# Patient Record
Sex: MALE | Race: WHITE | NOT HISPANIC OR LATINO
[De-identification: names, ages, dates, MRNs, and addresses within clinical notes are randomized per-mention and may not be internally consistent; named-entity substitution may affect disease eponyms.]

---

## 2017-03-19 ENCOUNTER — APPOINTMENT (OUTPATIENT)
Dept: POPULATION HEALTH | Facility: CLINIC | Age: 37
End: 2017-03-19

## 2019-01-16 ENCOUNTER — TRANSCRIPTION ENCOUNTER (OUTPATIENT)
Age: 39
End: 2019-01-16

## 2020-12-22 ENCOUNTER — APPOINTMENT (OUTPATIENT)
Dept: FAMILY MEDICINE | Facility: CLINIC | Age: 40
End: 2020-12-22
Payer: COMMERCIAL

## 2020-12-22 ENCOUNTER — NON-APPOINTMENT (OUTPATIENT)
Age: 40
End: 2020-12-22

## 2020-12-22 VITALS
WEIGHT: 215 LBS | HEART RATE: 64 BPM | RESPIRATION RATE: 14 BRPM | BODY MASS INDEX: 29.12 KG/M2 | TEMPERATURE: 98.4 F | DIASTOLIC BLOOD PRESSURE: 80 MMHG | SYSTOLIC BLOOD PRESSURE: 118 MMHG | HEIGHT: 72 IN | OXYGEN SATURATION: 98 %

## 2020-12-22 PROCEDURE — 00029S TESTOSTERONE: CUSTOM

## 2020-12-22 PROCEDURE — 00018S: CUSTOM

## 2020-12-22 PROCEDURE — 00017S: CUSTOM

## 2020-12-22 PROCEDURE — 00011S: CUSTOM

## 2020-12-22 PROCEDURE — 00012S: CUSTOM

## 2020-12-22 PROCEDURE — 00001S: CUSTOM

## 2020-12-22 PROCEDURE — 00026S: CUSTOM

## 2020-12-22 PROCEDURE — 00027S: CUSTOM

## 2020-12-22 PROCEDURE — 00028S VITAMIN D: CUSTOM

## 2020-12-22 PROCEDURE — 00009S: CUSTOM

## 2020-12-22 NOTE — HEALTH RISK ASSESSMENT
[Good] : ~his/her~  mood as  good [] : No [Yes] : Yes [Monthly or less (1 pt)] : Monthly or less (1 point) [1 or 2 (0 pts)] : 1 or 2 (0 points) [Never (0 pts)] : Never (0 points) [No falls in past year] : Patient reported no falls in the past year [0] : 2) Feeling down, depressed, or hopeless: Not at all (0) [HIV Test offered] : HIV Test offered [Hepatitis C test offered] : Hepatitis C test offered [Change in mental status noted] : No change in mental status noted [Alone] : lives alone [College] : College [] :

## 2021-11-02 ENCOUNTER — APPOINTMENT (OUTPATIENT)
Dept: DERMATOLOGY | Facility: CLINIC | Age: 41
End: 2021-11-02

## 2021-12-09 ENCOUNTER — NON-APPOINTMENT (OUTPATIENT)
Age: 41
End: 2021-12-09

## 2021-12-09 ENCOUNTER — APPOINTMENT (OUTPATIENT)
Dept: FAMILY MEDICINE | Facility: CLINIC | Age: 41
End: 2021-12-09
Payer: COMMERCIAL

## 2021-12-09 VITALS
TEMPERATURE: 97.9 F | WEIGHT: 212 LBS | SYSTOLIC BLOOD PRESSURE: 110 MMHG | DIASTOLIC BLOOD PRESSURE: 70 MMHG | RESPIRATION RATE: 16 BRPM | OXYGEN SATURATION: 98 % | HEART RATE: 68 BPM | HEIGHT: 72 IN | BODY MASS INDEX: 28.71 KG/M2

## 2021-12-09 PROCEDURE — 00009S: CUSTOM

## 2021-12-09 PROCEDURE — 00029S TESTOSTERONE: CUSTOM

## 2021-12-09 PROCEDURE — 00018S: CUSTOM

## 2021-12-09 PROCEDURE — 00026S: CUSTOM

## 2021-12-09 PROCEDURE — 00028S VITAMIN D: CUSTOM

## 2021-12-09 PROCEDURE — 00012S: CUSTOM

## 2021-12-09 PROCEDURE — 00027S: CUSTOM

## 2021-12-09 PROCEDURE — 00011S: CUSTOM

## 2021-12-09 PROCEDURE — 00002S: CUSTOM

## 2021-12-09 NOTE — HEALTH RISK ASSESSMENT
[Good] : ~his/her~  mood as  good [Yes] : Yes [2 - 3 times a week (3 pts)] : 2 - 3  times a week (3 points) [1 or 2 (0 pts)] : 1 or 2 (0 points) [Never (0 pts)] : Never (0 points) [No] : In the past 12 months have you used drugs other than those required for medical reasons? No [No falls in past year] : Patient reported no falls in the past year [0] : 2) Feeling down, depressed, or hopeless: Not at all (0) [HIV Test offered] : HIV Test offered [With Family] : lives with family [Employed] : employed [Fully functional (bathing, dressing, toileting, transferring, walking, feeding)] : Fully functional (bathing, dressing, toileting, transferring, walking, feeding) [Fully functional (using the telephone, shopping, preparing meals, housekeeping, doing laundry, using] : Fully functional and needs no help or supervision to perform IADLs (using the telephone, shopping, preparing meals, housekeeping, doing laundry, using transportation, managing medications and managing finances) [Smoke Detector] : smoke detector [Carbon Monoxide Detector] : carbon monoxide detector [Safety elements used in home] : safety elements used in home [Patient/Caregiver not ready to engage] : , patient/caregiver not ready to engage [Audit-CScore] : 3 [[id number]] : 0 [Change in mental status noted] : No change in mental status noted [Reports changes in hearing] : Reports no changes in hearing [Reports changes in vision] : Reports no changes in vision [Reports changes in dental health] : Reports no changes in dental health

## 2023-01-09 ENCOUNTER — APPOINTMENT (OUTPATIENT)
Dept: FAMILY MEDICINE | Facility: CLINIC | Age: 43
End: 2023-01-09

## 2023-01-09 NOTE — HEALTH RISK ASSESSMENT
[Good] : ~his/her~  mood as  good [Yes] : Yes [2 - 3 times a week (3 pts)] : 2 - 3  times a week (3 points) [1 or 2 (0 pts)] : 1 or 2 (0 points) [Never (0 pts)] : Never (0 points) [No] : In the past 12 months have you used drugs other than those required for medical reasons? No [No falls in past year] : Patient reported no falls in the past year [0] : 2) Feeling down, depressed, or hopeless: Not at all (0) [Audit-CScore] : 3 [[id number]] : 0 [HIV Test offered] : HIV Test offered [Change in mental status noted] : No change in mental status noted [With Family] : lives with family [Employed] : employed [Fully functional (bathing, dressing, toileting, transferring, walking, feeding)] : Fully functional (bathing, dressing, toileting, transferring, walking, feeding) [Fully functional (using the telephone, shopping, preparing meals, housekeeping, doing laundry, using] : Fully functional and needs no help or supervision to perform IADLs (using the telephone, shopping, preparing meals, housekeeping, doing laundry, using transportation, managing medications and managing finances) [Reports changes in hearing] : Reports no changes in hearing [Reports changes in vision] : Reports no changes in vision [Reports changes in dental health] : Reports no changes in dental health [Smoke Detector] : smoke detector [Carbon Monoxide Detector] : carbon monoxide detector [Safety elements used in home] : safety elements used in home [Patient/Caregiver not ready to engage] : , patient/caregiver not ready to engage

## 2023-01-09 NOTE — HEALTH RISK ASSESSMENT
[Good] : ~his/her~  mood as  good [Yes] : Yes [2 - 3 times a week (3 pts)] : 2 - 3  times a week (3 points) [1 or 2 (0 pts)] : 1 or 2 (0 points) [Never (0 pts)] : Never (0 points) [No] : In the past 12 months have you used drugs other than those required for medical reasons? No [No falls in past year] : Patient reported no falls in the past year [0] : 2) Feeling down, depressed, or hopeless: Not at all (0) [Audit-CScore] : 3 [[id number]] : 0 [HIV Test offered] : HIV Test offered [Change in mental status noted] : No change in mental status noted [With Family] : lives with family [Employed] : employed [Fully functional (bathing, dressing, toileting, transferring, walking, feeding)] : Fully functional (bathing, dressing, toileting, transferring, walking, feeding) [Fully functional (using the telephone, shopping, preparing meals, housekeeping, doing laundry, using] : Fully functional and needs no help or supervision to perform IADLs (using the telephone, shopping, preparing meals, housekeeping, doing laundry, using transportation, managing medications and managing finances) [Reports changes in hearing] : Reports no changes in hearing [Reports changes in vision] : Reports no changes in vision [Reports changes in dental health] : Reports no changes in dental health [Smoke Detector] : smoke detector [Carbon Monoxide Detector] : carbon monoxide detector [Safety elements used in home] : safety elements used in home [Patient/Caregiver not ready to engage] : , patient/caregiver not ready to engage

## 2023-01-13 ENCOUNTER — APPOINTMENT (OUTPATIENT)
Dept: FAMILY MEDICINE | Facility: CLINIC | Age: 43
End: 2023-01-13

## 2023-01-13 ENCOUNTER — NON-APPOINTMENT (OUTPATIENT)
Age: 43
End: 2023-01-13

## 2023-01-13 ENCOUNTER — APPOINTMENT (OUTPATIENT)
Dept: FAMILY MEDICINE | Facility: CLINIC | Age: 43
End: 2023-01-13
Payer: COMMERCIAL

## 2023-01-13 VITALS
RESPIRATION RATE: 16 BRPM | HEART RATE: 80 BPM | OXYGEN SATURATION: 98 % | SYSTOLIC BLOOD PRESSURE: 122 MMHG | HEIGHT: 72 IN | TEMPERATURE: 98 F | DIASTOLIC BLOOD PRESSURE: 80 MMHG | WEIGHT: 212 LBS | BODY MASS INDEX: 28.71 KG/M2

## 2023-01-13 DIAGNOSIS — Z00.00 ENCOUNTER FOR GENERAL ADULT MEDICAL EXAMINATION W/OUT ABNORMAL FINDINGS: ICD-10-CM

## 2023-01-13 PROCEDURE — 00027S: CUSTOM

## 2023-01-13 PROCEDURE — 00009S: CUSTOM

## 2023-01-13 PROCEDURE — 00028S VITAMIN D: CUSTOM

## 2023-01-13 PROCEDURE — 00018S: CUSTOM

## 2023-01-13 PROCEDURE — 00029S TESTOSTERONE: CUSTOM

## 2023-01-13 PROCEDURE — 00011S: CUSTOM

## 2023-01-13 PROCEDURE — 00012S: CUSTOM

## 2023-01-13 PROCEDURE — 00026S: CUSTOM

## 2023-01-13 PROCEDURE — 00002S: CUSTOM

## 2023-02-13 ENCOUNTER — APPOINTMENT (OUTPATIENT)
Dept: SURGERY | Facility: CLINIC | Age: 43
End: 2023-02-13
Payer: COMMERCIAL

## 2023-02-13 VITALS
OXYGEN SATURATION: 100 % | BODY MASS INDEX: 30.1 KG/M2 | SYSTOLIC BLOOD PRESSURE: 137 MMHG | DIASTOLIC BLOOD PRESSURE: 87 MMHG | HEART RATE: 64 BPM | HEIGHT: 71 IN | RESPIRATION RATE: 16 BRPM | WEIGHT: 215 LBS | TEMPERATURE: 97.2 F

## 2023-02-13 DIAGNOSIS — K42.9 UMBILICAL HERNIA W/OUT OBSTRUCTION OR GANGRENE: ICD-10-CM

## 2023-02-13 DIAGNOSIS — Z78.9 OTHER SPECIFIED HEALTH STATUS: ICD-10-CM

## 2023-02-13 PROCEDURE — 99203 OFFICE O/P NEW LOW 30 MIN: CPT

## 2023-02-13 NOTE — HISTORY OF PRESENT ILLNESS
[de-identified] : The patient comes to the office in consultation by Dr. Jona Fontaine for evaluation of left groin pain and a left inguinal hernia.  The patient reports that he has been having intermittent burning pain with a bulge that has developed in the left groin.  The patient states that the bulge has been present for several months and appears slightly larger now. He has no abdominal pain, nausea, or vomit.  No right groin pain or bulge.

## 2023-02-13 NOTE — PHYSICAL EXAM
[JVD] : no jugular venous distention  [Purpura] : no purpura  [Petechiae] : no petechiae [Skin Ulcer] : no ulcer [Skin Induration] : no induration [Alert] : alert [Oriented to Person] : oriented to person [Oriented to Place] : oriented to place [Oriented to Time] : oriented to time [Calm] : calm [de-identified] : non toxic, in no acute distress  [de-identified] : NC/AT PERRL EOMI no scleral icterus  [de-identified] : trachea midline, no gross mass  [de-identified] : no audible wheezing or stridor  [de-identified] : mildly obese soft, no localizing tenderness, no guarding, no rebound, no masses   [de-identified] : phallus normal, no testicular mass or tenderness  [de-identified] : FROM of all extremities with no gross deformity or angulation, there is a small non tender umbilical hernia, there is no ventral hernia, no right inguinal hernia, there is a moderate reducible left inguinal hernia  [de-identified] : appendectomy scar noted in the right lower abdominal area  [de-identified] : mood is calm

## 2023-02-13 NOTE — ASSESSMENT
[FreeTextEntry1] : The patient is a 42 year old male with intermittent left groin pain and a left inguinal hernia. He also has a very small non tender umbilical hernia.  The patient has been advised that he will benefit from a left inguinal hernia repair.  The risks, benefits, and alternatives including the option of doing nothing to a robotic, possible, laparoscopic and possible open left inguinal hernia repair with mesh were discussed.  The potential complications including but not limited to infection, bleeding, hernia recurrence, chronic post-operative pain, and seroma formation were discussed.  The patient was educated regarding the signs and symptoms of hernia strangulation and advised to seek immediate MD evaluation should this occur.  The patient understands and wishes to proceed at the next available time. A total of 40 minutes was spent coordinating the care of the patient.

## 2023-02-13 NOTE — CONSULT LETTER
[Dear  ___] : Dear  [unfilled], [Consult Letter:] : I had the pleasure of evaluating your patient, [unfilled]. [Please see my note below.] : Please see my note below. [Consult Closing:] : Thank you very much for allowing me to participate in the care of this patient.  If you have any questions, please do not hesitate to contact me. [Sincerely,] : Sincerely, [FreeTextEntry3] : Luca Hopkins MD, FACS\par  \par Department of Surgery\par [facility]\par [facility]\par

## 2023-02-18 ENCOUNTER — TRANSCRIPTION ENCOUNTER (OUTPATIENT)
Age: 43
End: 2023-02-18

## 2023-02-18 ENCOUNTER — INPATIENT (INPATIENT)
Facility: HOSPITAL | Age: 43
LOS: 0 days | Discharge: HOME | DRG: 350 | End: 2023-02-19
Attending: SURGERY | Admitting: SURGERY
Payer: COMMERCIAL

## 2023-02-18 VITALS
SYSTOLIC BLOOD PRESSURE: 143 MMHG | RESPIRATION RATE: 16 BRPM | HEART RATE: 76 BPM | OXYGEN SATURATION: 100 % | DIASTOLIC BLOOD PRESSURE: 92 MMHG | TEMPERATURE: 98 F

## 2023-02-18 DIAGNOSIS — Z90.49 ACQUIRED ABSENCE OF OTHER SPECIFIED PARTS OF DIGESTIVE TRACT: Chronic | ICD-10-CM

## 2023-02-18 DIAGNOSIS — K46.0 UNSPECIFIED ABDOMINAL HERNIA WITH OBSTRUCTION, WITHOUT GANGRENE: ICD-10-CM

## 2023-02-18 LAB
ALBUMIN SERPL ELPH-MCNC: 4.5 G/DL — SIGNIFICANT CHANGE UP (ref 3.3–5.2)
ALP SERPL-CCNC: 49 U/L — SIGNIFICANT CHANGE UP (ref 40–120)
ALT FLD-CCNC: 27 U/L — SIGNIFICANT CHANGE UP
ANION GAP SERPL CALC-SCNC: 15 MMOL/L — SIGNIFICANT CHANGE UP (ref 5–17)
APTT BLD: 30.1 SEC — SIGNIFICANT CHANGE UP (ref 27.5–35.5)
AST SERPL-CCNC: 28 U/L — SIGNIFICANT CHANGE UP
BASOPHILS # BLD AUTO: 0.04 K/UL — SIGNIFICANT CHANGE UP (ref 0–0.2)
BASOPHILS NFR BLD AUTO: 0.5 % — SIGNIFICANT CHANGE UP (ref 0–2)
BILIRUB SERPL-MCNC: 0.2 MG/DL — LOW (ref 0.4–2)
BLD GP AB SCN SERPL QL: SIGNIFICANT CHANGE UP
BUN SERPL-MCNC: 17.6 MG/DL — SIGNIFICANT CHANGE UP (ref 8–20)
CALCIUM SERPL-MCNC: 9.3 MG/DL — SIGNIFICANT CHANGE UP (ref 8.4–10.5)
CHLORIDE SERPL-SCNC: 101 MMOL/L — SIGNIFICANT CHANGE UP (ref 96–108)
CO2 SERPL-SCNC: 25 MMOL/L — SIGNIFICANT CHANGE UP (ref 22–29)
CREAT SERPL-MCNC: 1.02 MG/DL — SIGNIFICANT CHANGE UP (ref 0.5–1.3)
EGFR: 94 ML/MIN/1.73M2 — SIGNIFICANT CHANGE UP
EOSINOPHIL # BLD AUTO: 0.31 K/UL — SIGNIFICANT CHANGE UP (ref 0–0.5)
EOSINOPHIL NFR BLD AUTO: 3.6 % — SIGNIFICANT CHANGE UP (ref 0–6)
GLUCOSE SERPL-MCNC: 85 MG/DL — SIGNIFICANT CHANGE UP (ref 70–99)
HCT VFR BLD CALC: 41.9 % — SIGNIFICANT CHANGE UP (ref 39–50)
HGB BLD-MCNC: 14.5 G/DL — SIGNIFICANT CHANGE UP (ref 13–17)
IMM GRANULOCYTES NFR BLD AUTO: 0.2 % — SIGNIFICANT CHANGE UP (ref 0–0.9)
INR BLD: 1.03 RATIO — SIGNIFICANT CHANGE UP (ref 0.88–1.16)
LACTATE BLDV-MCNC: 2.1 MMOL/L — HIGH (ref 0.5–2)
LYMPHOCYTES # BLD AUTO: 3.7 K/UL — HIGH (ref 1–3.3)
LYMPHOCYTES # BLD AUTO: 43.3 % — SIGNIFICANT CHANGE UP (ref 13–44)
MCHC RBC-ENTMCNC: 30.3 PG — SIGNIFICANT CHANGE UP (ref 27–34)
MCHC RBC-ENTMCNC: 34.6 GM/DL — SIGNIFICANT CHANGE UP (ref 32–36)
MCV RBC AUTO: 87.7 FL — SIGNIFICANT CHANGE UP (ref 80–100)
MONOCYTES # BLD AUTO: 0.61 K/UL — SIGNIFICANT CHANGE UP (ref 0–0.9)
MONOCYTES NFR BLD AUTO: 7.1 % — SIGNIFICANT CHANGE UP (ref 2–14)
NEUTROPHILS # BLD AUTO: 3.86 K/UL — SIGNIFICANT CHANGE UP (ref 1.8–7.4)
NEUTROPHILS NFR BLD AUTO: 45.3 % — SIGNIFICANT CHANGE UP (ref 43–77)
PLATELET # BLD AUTO: 269 K/UL — SIGNIFICANT CHANGE UP (ref 150–400)
POTASSIUM SERPL-MCNC: 3.9 MMOL/L — SIGNIFICANT CHANGE UP (ref 3.5–5.3)
POTASSIUM SERPL-SCNC: 3.9 MMOL/L — SIGNIFICANT CHANGE UP (ref 3.5–5.3)
PROT SERPL-MCNC: 7.2 G/DL — SIGNIFICANT CHANGE UP (ref 6.6–8.7)
PROTHROM AB SERPL-ACNC: 11.9 SEC — SIGNIFICANT CHANGE UP (ref 10.5–13.4)
RBC # BLD: 4.78 M/UL — SIGNIFICANT CHANGE UP (ref 4.2–5.8)
RBC # FLD: 12.2 % — SIGNIFICANT CHANGE UP (ref 10.3–14.5)
SARS-COV-2 RNA SPEC QL NAA+PROBE: DETECTED
SODIUM SERPL-SCNC: 141 MMOL/L — SIGNIFICANT CHANGE UP (ref 135–145)
WBC # BLD: 8.54 K/UL — SIGNIFICANT CHANGE UP (ref 3.8–10.5)
WBC # FLD AUTO: 8.54 K/UL — SIGNIFICANT CHANGE UP (ref 3.8–10.5)

## 2023-02-18 PROCEDURE — 99285 EMERGENCY DEPT VISIT HI MDM: CPT

## 2023-02-18 PROCEDURE — 88302 TISSUE EXAM BY PATHOLOGIST: CPT | Mod: 26

## 2023-02-18 DEVICE — MESH HERNIA MARLEX 1 X 4": Type: IMPLANTABLE DEVICE | Site: LEFT | Status: FUNCTIONAL

## 2023-02-18 RX ORDER — MORPHINE SULFATE 50 MG/1
4 CAPSULE, EXTENDED RELEASE ORAL ONCE
Refills: 0 | Status: DISCONTINUED | OUTPATIENT
Start: 2023-02-18 | End: 2023-02-18

## 2023-02-18 RX ORDER — SODIUM CHLORIDE 9 MG/ML
1000 INJECTION, SOLUTION INTRAVENOUS ONCE
Refills: 0 | Status: COMPLETED | OUTPATIENT
Start: 2023-02-18 | End: 2023-02-18

## 2023-02-18 RX ORDER — IBUPROFEN 200 MG
600 TABLET ORAL EVERY 6 HOURS
Refills: 0 | Status: DISCONTINUED | OUTPATIENT
Start: 2023-02-18 | End: 2023-02-18

## 2023-02-18 RX ORDER — SODIUM CHLORIDE 9 MG/ML
1000 INJECTION, SOLUTION INTRAVENOUS
Refills: 0 | Status: DISCONTINUED | OUTPATIENT
Start: 2023-02-18 | End: 2023-02-18

## 2023-02-18 RX ORDER — ENOXAPARIN SODIUM 100 MG/ML
40 INJECTION SUBCUTANEOUS EVERY 24 HOURS
Refills: 0 | Status: DISCONTINUED | OUTPATIENT
Start: 2023-02-18 | End: 2023-02-18

## 2023-02-18 RX ORDER — ONDANSETRON 8 MG/1
4 TABLET, FILM COATED ORAL EVERY 6 HOURS
Refills: 0 | Status: DISCONTINUED | OUTPATIENT
Start: 2023-02-18 | End: 2023-02-18

## 2023-02-18 RX ORDER — ACETAMINOPHEN 500 MG
975 TABLET ORAL EVERY 6 HOURS
Refills: 0 | Status: DISCONTINUED | OUTPATIENT
Start: 2023-02-18 | End: 2023-02-18

## 2023-02-18 RX ADMIN — SODIUM CHLORIDE 1000 MILLILITER(S): 9 INJECTION, SOLUTION INTRAVENOUS at 23:30

## 2023-02-18 RX ADMIN — MORPHINE SULFATE 4 MILLIGRAM(S): 50 CAPSULE, EXTENDED RELEASE ORAL at 22:18

## 2023-02-18 NOTE — H&P ADULT - ATTENDING COMMENTS
Awake alert  Bilateral BS  Hemodynamic intact  Abdomen soft   LIH incarcerated and non-reducible, most likely indirect  For OR now

## 2023-02-18 NOTE — ED STATDOCS - GENITOURINARY, MLM
normal... Chaperoned by barbie Richmond: irreducible mass in left inguinal region, no testicular pain or swelling, no CVA tenderness no skin changes

## 2023-02-18 NOTE — ED ADULT TRIAGE NOTE - CHIEF COMPLAINT QUOTE
ambulatory to triage c/o right inguinal hernia pain x 4 days. pt unable to bear pain and having difficulty urinating. has schedule for surgery.

## 2023-02-18 NOTE — H&P ADULT - HISTORY OF PRESENT ILLNESS
43 yo M with PSH of appendectomy 20 years ago and no PMH presents to the ED complaining of 3 days of increasing abdominal/L groin pain associated with enlarging bulge. Patient reports that earlier this week he was being evaluated for a L inguinal hernia repair as an outpatient, however, he developed worsening symptoms accompanied now by obstipation for the last 3 days.   Reports intermittent chills without fever. Denies emesis.  Denies chest pain, dyspnea.  Denies headaches, dizziness, changes in vision.

## 2023-02-18 NOTE — ED STATDOCS - PROGRESS NOTE DETAILS
Landon Richmond for ED attending, Dr. Toussaint: surgery consulted, made aware of patient DONYA BARRAGAN: tba for surgical intervention

## 2023-02-18 NOTE — ED ADULT NURSE NOTE - OBJECTIVE STATEMENT
Pt presenting to Emergency Department complaining of lower abd pain x 3 days. Pt reports being seen outpatient for possible inguinal hernia. Pt states " Im in so much pain and my stomach is getting bigger". Pt reports constipation for 3 days. Pt denies chest pain, SOB, headaches, dizziness, n/v/d, fevers, chills, dysuria. No PMHx. Denies allergies. Respirations even and unlabored. Skin warm to touch.

## 2023-02-18 NOTE — ED STATDOCS - NS ED ATTENDING STATEMENT MOD
This was a shared visit with the FLORINDA. I reviewed and verified the documentation and independently performed the documented:

## 2023-02-18 NOTE — ED ADULT NURSE NOTE - NSIMPLEMENTINTERV_GEN_ALL_ED
Implemented All Universal Safety Interventions:  [place] to call system. Call bell, personal items and telephone within reach. Instruct patient to call for assistance. Room bathroom lighting operational. Non-slip footwear when patient is off stretcher. Physically safe environment: no spills, clutter or unnecessary equipment. Stretcher in lowest position, wheels locked, appropriate side rails in place.

## 2023-02-18 NOTE — H&P ADULT - NSHPPHYSICALEXAM_GEN_ALL_CORE
PHYSICAL EXAM:  GENERAL: NAD, well-groomed, well-developed  HEAD:  Atraumatic, Normocephalic  EYES: EOMI, PERRLA, conjunctiva and sclera clear  NECK: Supple, No JVD  CHEST/LUNG: non-labored breathing on room air.   HEART: Regular rate and rhythm; S1/S2  ABDOMEN: Soft, slightly distended, tender on the LLQ and L groin with palpable bulge, non-reducible, no skin changes.   VASCULAR: Normal pulses, Normal capillary refill  EXTREMITIES:  2+ Peripheral Pulses, No cyanosis, No edema  SKIN: Warm, Intact

## 2023-02-18 NOTE — ED ADULT NURSE NOTE - NS ED NOTE ABUSE RESPONSE YN
Date of service: 7/25/21    Surgeon: Lenore De La Cruz MD  Assistant: None    Pre operative Diagnosis: Laceration of right chin, 2 cm  Post operative Diagnosis:  same    Operation:  complex laceration repair of right chin, 2 cm    Anesthesia:  5 cc of lidocaine with epinephrine    Findings/Procedure:  The patient was prepped and draped in the usual sterile manner with betadine.  5cc lidocaine with epinephrine was used to anesthetize the wound.  Copious amount of saline irrigation was performed.  An excisional sharp scissor debridement was performed of all devitalized tissue at the wound margin edges.  The mentalis muscle layer is reapproximated and closed using 5-0 monocryl buried figure 8 sutures. The dermis is closed using 5-0 monocryl deep, buried interrupted sutures. The epidermis is closed with simple interrupted 5-0 chromic gut sutures. The laceration is dressed with bandaid and bacitracin.  The patient tolerated the procedure well without complications. All questions are answered. Follow up in my office in the next 1-2 weeks.    Estimated blood loss: 2 cc    Patient Condition: Well and stable    Specimen: none Yes

## 2023-02-18 NOTE — H&P ADULT - ASSESSMENT
41 yo M presenting with incarcerated L inguinal hernia on history and physical exam.   Patient is now HDN stable, with 3 days of obstipation.    Admit to surgery  Pain control  NPO/IVF  OR now for open L inguinal hernia repair  Obtain pre-op labs STAT  OOB  IS x10/hr  DVT ppx

## 2023-02-18 NOTE — ED STATDOCS - CLINICAL SUMMARY MEDICAL DECISION MAKING FREE TEXT BOX
pt p/w tenderness to left inguinal canal and LLQ, concern for incarcerated inguinal hernia, will obtain labs, CT, pain control, surgical consultation. pt p/w tenderness to left inguinal canal and LLQ, concern for incarcerated inguinal hernia, will obtain labs, CT, pain control, surgical consultation.    DONYA BARRAGAN:  pt with incarcerated leg inguinal hernia, surgery consulted and TBA for surgical manamagnt pt p/w tenderness to left inguinal canal and LLQ, concern for incarcerated inguinal hernia, will obtain labs, CT, pain control, surgical consultation.    DONYA BARRAGAN:  pt with incarcerated leg inguinal hernia, surgery consulted and TBA for surgical management. ct canceled by surgery

## 2023-02-18 NOTE — ED STATDOCS - OBJECTIVE STATEMENT
41 y/o male presents to the ED c/o abdominal pain. Pt states he was recently diagnosed with left inguinal hernia a few days ago, however over the past 2 days he has developed worsening pain and swelling, states he feels bloated, has had decreased BM and is unable to pass flatus. Pain worse with movement and bending over. Pt endorses nausea, denies vomiting. Pt has been taking Tylenol and Advil without relief. Pt denies past abdominal surgeries.    Surg: Dr. Hopkins

## 2023-02-19 ENCOUNTER — TRANSCRIPTION ENCOUNTER (OUTPATIENT)
Age: 43
End: 2023-02-19

## 2023-02-19 VITALS
TEMPERATURE: 98 F | DIASTOLIC BLOOD PRESSURE: 79 MMHG | OXYGEN SATURATION: 98 % | RESPIRATION RATE: 16 BRPM | SYSTOLIC BLOOD PRESSURE: 153 MMHG | HEART RATE: 63 BPM

## 2023-02-19 PROCEDURE — 80053 COMPREHEN METABOLIC PANEL: CPT

## 2023-02-19 PROCEDURE — 88302 TISSUE EXAM BY PATHOLOGIST: CPT

## 2023-02-19 PROCEDURE — 85025 COMPLETE CBC W/AUTO DIFF WBC: CPT

## 2023-02-19 PROCEDURE — 86900 BLOOD TYPING SEROLOGIC ABO: CPT

## 2023-02-19 PROCEDURE — 86901 BLOOD TYPING SEROLOGIC RH(D): CPT

## 2023-02-19 PROCEDURE — 36415 COLL VENOUS BLD VENIPUNCTURE: CPT

## 2023-02-19 PROCEDURE — 99285 EMERGENCY DEPT VISIT HI MDM: CPT | Mod: 25

## 2023-02-19 PROCEDURE — U0005: CPT

## 2023-02-19 PROCEDURE — C1781: CPT

## 2023-02-19 PROCEDURE — U0003: CPT

## 2023-02-19 PROCEDURE — 85610 PROTHROMBIN TIME: CPT

## 2023-02-19 PROCEDURE — 86850 RBC ANTIBODY SCREEN: CPT

## 2023-02-19 PROCEDURE — 85730 THROMBOPLASTIN TIME PARTIAL: CPT

## 2023-02-19 PROCEDURE — C9399: CPT

## 2023-02-19 PROCEDURE — 83605 ASSAY OF LACTIC ACID: CPT

## 2023-02-19 RX ORDER — SENNA PLUS 8.6 MG/1
2 TABLET ORAL AT BEDTIME
Refills: 0 | Status: DISCONTINUED | OUTPATIENT
Start: 2023-02-19 | End: 2023-02-19

## 2023-02-19 RX ORDER — POLYETHYLENE GLYCOL 3350 17 G/17G
17 POWDER, FOR SOLUTION ORAL DAILY
Refills: 0 | Status: DISCONTINUED | OUTPATIENT
Start: 2023-02-19 | End: 2023-02-19

## 2023-02-19 RX ORDER — IBUPROFEN 200 MG
600 TABLET ORAL EVERY 6 HOURS
Refills: 0 | Status: DISCONTINUED | OUTPATIENT
Start: 2023-02-19 | End: 2023-02-19

## 2023-02-19 RX ORDER — FENTANYL CITRATE 50 UG/ML
25 INJECTION INTRAVENOUS
Refills: 0 | Status: DISCONTINUED | OUTPATIENT
Start: 2023-02-19 | End: 2023-02-19

## 2023-02-19 RX ORDER — ENOXAPARIN SODIUM 100 MG/ML
40 INJECTION SUBCUTANEOUS EVERY 24 HOURS
Refills: 0 | Status: DISCONTINUED | OUTPATIENT
Start: 2023-02-19 | End: 2023-02-19

## 2023-02-19 RX ORDER — ONDANSETRON 8 MG/1
4 TABLET, FILM COATED ORAL ONCE
Refills: 0 | Status: DISCONTINUED | OUTPATIENT
Start: 2023-02-19 | End: 2023-02-19

## 2023-02-19 RX ORDER — IBUPROFEN 200 MG
1 TABLET ORAL
Qty: 0 | Refills: 0 | DISCHARGE
Start: 2023-02-19

## 2023-02-19 RX ORDER — ACETAMINOPHEN 500 MG
975 TABLET ORAL EVERY 6 HOURS
Refills: 0 | Status: DISCONTINUED | OUTPATIENT
Start: 2023-02-19 | End: 2023-02-19

## 2023-02-19 RX ORDER — ACETAMINOPHEN 500 MG
3 TABLET ORAL
Qty: 0 | Refills: 0 | DISCHARGE
Start: 2023-02-19

## 2023-02-19 RX ORDER — HYDROMORPHONE HYDROCHLORIDE 2 MG/ML
0.5 INJECTION INTRAMUSCULAR; INTRAVENOUS; SUBCUTANEOUS
Refills: 0 | Status: DISCONTINUED | OUTPATIENT
Start: 2023-02-19 | End: 2023-02-19

## 2023-02-19 RX ADMIN — ENOXAPARIN SODIUM 40 MILLIGRAM(S): 100 INJECTION SUBCUTANEOUS at 05:22

## 2023-02-19 RX ADMIN — Medication 975 MILLIGRAM(S): at 06:20

## 2023-02-19 RX ADMIN — Medication 975 MILLIGRAM(S): at 12:15

## 2023-02-19 RX ADMIN — Medication 975 MILLIGRAM(S): at 05:22

## 2023-02-19 RX ADMIN — HYDROMORPHONE HYDROCHLORIDE 0.5 MILLIGRAM(S): 2 INJECTION INTRAMUSCULAR; INTRAVENOUS; SUBCUTANEOUS at 01:11

## 2023-02-19 RX ADMIN — HYDROMORPHONE HYDROCHLORIDE 0.5 MILLIGRAM(S): 2 INJECTION INTRAMUSCULAR; INTRAVENOUS; SUBCUTANEOUS at 01:02

## 2023-02-19 NOTE — DISCHARGE NOTE PROVIDER - HOSPITAL COURSE
Admission HPI:  43 yo M with PSH of appendectomy 20 years ago and no PMH presents to the ED complaining of 3 days of increasing abdominal/L groin pain associated with enlarging bulge. Patient reports that earlier this week he was being evaluated for a L inguinal hernia repair as an outpatient, however, he developed worsening symptoms accompanied now by obstipation for the last 3 days. Reports intermittent chills without fever. Denies emesis.  Denies chest pain, dyspnea.  Denies headaches, dizziness, changes in vision.   (18 Feb 2023 22:03)    Hospital Course:   L inguinal hernia non-reducible. He was admitted to the ACS service & taken to the OR on 2/19 AM for open L inguinal hernia repair with mesh - all bowel viable. Pt tolerated procedure well and returned to surgical floors in stable condition. Post-op he is tolerating diet, pain controlled, OOB ambulating, voiding, and stable for discharge with outpatient follow-up.    Patient is advised to RETURN TO THE EMERGENCY DEPARTMENT for any of the following - worsening pain, fever/chills, nausea/vomiting, altered mental status, chest pain, shortness of breath, or any other new / worsening symptom.
oriented to person, place and time

## 2023-02-19 NOTE — PROGRESS NOTE ADULT - ASSESSMENT
41yo M POD 0 from left inguinal hernia repair with mesh for an incarcerated inguinal hernia with viable small bowel.     Plan:  -Discharge today if tolerates diet.  -f/u in office in 2 week  -no heavy lifting

## 2023-02-19 NOTE — BRIEF OPERATIVE NOTE - NSICDXBRIEFPROCEDURE_GEN_ALL_CORE_FT
PROCEDURES:  Open repair of inguinal hernia using mesh in adult 19-Feb-2023 00:48:33  Kwan Guajardo

## 2023-02-19 NOTE — DISCHARGE NOTE PROVIDER - NSFOLLOWUPCLINICS_GEN_ALL_ED_FT
[facility] Acute Care Surgery  Acute Care Surgery  [address] [place] [id number]  Phone: [phone]  Fax:

## 2023-02-19 NOTE — DISCHARGE NOTE NURSING/CASE MANAGEMENT/SOCIAL WORK - PATIENT PORTAL LINK FT
You can access the FollowMyHealth Patient Portal offered by [facility] by registering at the following website: [email or web address]. By joining [employer]’s FollowMyHealth portal, you will also be able to view your health information using other applications (apps) compatible with our system.

## 2023-02-19 NOTE — DISCHARGE NOTE PROVIDER - NSDCCPCAREPLAN_GEN_ALL_CORE_FT
PRINCIPAL DISCHARGE DIAGNOSIS  Diagnosis: Incarcerated hernia  Assessment and Plan of Treatment: Follow up: Please call and make an appointment with the Acute Care Surgery Clinic 10-14 days after discharge. Also, please call and make an appointment with your primary care physician as per your usual schedule.   Activity: May return to normal activities as tolerated, however refrain from heavy lifting > 10-15 lbs.  Diet: May continue regular diet.  Medications: Please take all medications listed on your discharge paperwork as prescribed. You can take over-the-counter tylenol and/or ibuprofen for pain relief, as needed.  Wound Care: Please, keep wound site clean and dry. You may shower, but do not bathe.  Patient is advised to RETURN TO THE EMERGENCY DEPARTMENT for any of the following - worsening pain, fever/chills, nausea/vomiting, altered mental status, chest pain, shortness of breath, or any other new / worsening symptom.

## 2023-02-19 NOTE — DISCHARGE NOTE PROVIDER - NSDCMRMEDTOKEN_GEN_ALL_CORE_FT
acetaminophen 325 mg oral tablet: 3 tab(s) orally every 6 hours, As Needed for mild - moderate pain  ibuprofen 600 mg oral tablet: 1 tab(s) orally every 6 hours, As needed, for mild - moderate pain

## 2023-02-19 NOTE — DISCHARGE NOTE NURSING/CASE MANAGEMENT/SOCIAL WORK - NSDCPEFALRISK_GEN_ALL_CORE
For information on Fall & Injury Prevention, visit: [email or web address] OR  [email or web address] OR  https://www.cdc.gov/steadi/patient.html

## 2023-02-19 NOTE — DISCHARGE NOTE PROVIDER - NSDCCPTREATMENT_GEN_ALL_CORE_FT
PRINCIPAL PROCEDURE  Procedure: Open repair of inguinal hernia using mesh in adult  Findings and Treatment:

## 2023-02-19 NOTE — PROGRESS NOTE ADULT - SUBJECTIVE AND OBJECTIVE BOX
HPI/OVERNIGHT EVENTS: Patient seen and examined at bedside. No overnight events. No complaints. Denies fever, chills, nausea, vomiting, chest pain, SOB, dizziness, abd pain or any other concerning symptoms.    Vital Signs Last 24 Hrs  T(C): 36.5 (19 Feb 2023 04:27), Max: 37 (19 Feb 2023 01:30)  T(F): 97.7 (19 Feb 2023 04:27), Max: 98.6 (19 Feb 2023 01:30)  HR: 75 (19 Feb 2023 04:27) (75 - 100)  BP: 120/73 (19 Feb 2023 04:27) (120/73 - 149/79)  BP(mean): 96 (19 Feb 2023 01:30) (82 - 96)  RR: 18 (19 Feb 2023 04:27) (16 - 20)  SpO2: 96% (19 Feb 2023 04:27) (96% - 100%)    Parameters below as of 19 Feb 2023 04:27  Patient On (Oxygen Delivery Method): room air    I&O's Detail      Constitutional: patient resting comfortably in bed, in no acute distress  HEENT: EOMI, PERRLA, MMM.  Respiratory: Non labored breathing on RA  Cardiovascular: RRR  Gastrointestinal: Abdomen soft, non-tender, non-distended, no rebound tenderness / guarding. LLQ incision site is c/d/i w/o surrounding erythema.   Musculoskeletal: No joint pain, swelling or deformity; no limitation of movement  Vascular: Extremities warm and well perfused.     LABS:                        14.5   8.54  )-----------( 269      ( 18 Feb 2023 22:00 )             41.9     02-18    141  |  101  |  17.6  ----------------------------<  85  3.9   |  25.0  |  1.02    Ca    9.3      18 Feb 2023 22:00    TPro  7.2  /  Alb  4.5  /  TBili  0.2<L>  /  DBili  x   /  AST  28  /  ALT  27  /  AlkPhos  49  02-18    PT/INR - ( 18 Feb 2023 22:00 )   PT: 11.9 sec;   INR: 1.03 ratio         PTT - ( 18 Feb 2023 22:00 )  PTT:30.1 sec      MEDICATIONS  (STANDING):  acetaminophen     Tablet .. 975 milliGRAM(s) Oral every 6 hours  enoxaparin Injectable 40 milliGRAM(s) SubCutaneous every 24 hours  polyethylene glycol 3350 17 Gram(s) Oral daily  senna 2 Tablet(s) Oral at bedtime    MEDICATIONS  (PRN):  ibuprofen  Tablet. 600 milliGRAM(s) Oral every 6 hours PRN Mild Pain (1 - 3)

## 2023-02-19 NOTE — PATIENT PROFILE ADULT - FALL HARM RISK - HARM RISK INTERVENTIONS

## 2023-02-19 NOTE — PATIENT PROFILE ADULT - DO YOU FEEL LIKE HURTING YOURSELF OR OTHERS?
[facility] Pre-Op Assessment    Patient Appointment Date: Thursday April 12th @ [address] [facility], 39 y.o., male      does confirm site: Right jawline  does ID site. (Can they still visibly see the site)  does not have Hepatitis C   does not have HIV (If YES, set up consult appointment)    Allergies:  Not on File    does not have an Electrical Implanted Device (Pacemaker, AICD, brain stimulator, etc.)    does not need antibiotics    is not taking NSAIDs  is not taking aspirin      is not taking Garlic  is not taking Ginkgo  is not taking Ginseng  is not taking Fish oils  is not taking Vit E    does not take a blood thinner(i.e. Coumadin/Warfarin, Plavix, Brilinta, Pradaxa, Xarelto, Effient)  If taking Coumadin needs to have PT/INR drawn and faxed results within a week of surgery    Pre operative assessment questions asked to patient. Patient has a general understanding of the procedure, and has been versed that there will be local anesthesia used in the procedure and that He will be ok to drive themselves to and from the appointment. Patient has been notified to arrive 15-20 minutes early and they may eat or drink before arriving.
no

## 2023-02-21 PROBLEM — Z78.9 OTHER SPECIFIED HEALTH STATUS: Chronic | Status: ACTIVE | Noted: 2023-02-19

## 2023-03-03 LAB — SURGICAL PATHOLOGY STUDY: SIGNIFICANT CHANGE UP

## 2023-03-09 ENCOUNTER — APPOINTMENT (OUTPATIENT)
Dept: SURGERY | Facility: CLINIC | Age: 43
End: 2023-03-09
Payer: COMMERCIAL

## 2023-03-09 VITALS
WEIGHT: 216 LBS | RESPIRATION RATE: 16 BRPM | OXYGEN SATURATION: 99 % | HEART RATE: 62 BPM | HEIGHT: 71 IN | TEMPERATURE: 97.1 F | DIASTOLIC BLOOD PRESSURE: 77 MMHG | SYSTOLIC BLOOD PRESSURE: 131 MMHG | BODY MASS INDEX: 30.24 KG/M2

## 2023-03-09 PROCEDURE — 99024 POSTOP FOLLOW-UP VISIT: CPT

## 2023-03-09 NOTE — PHYSICAL EXAM
[JVD] : no jugular venous distention  [Purpura] : no purpura  [Petechiae] : no petechiae [Skin Ulcer] : no ulcer [Skin Induration] : no induration [Alert] : alert [Oriented to Person] : oriented to person [Oriented to Place] : oriented to place [Oriented to Time] : oriented to time [Calm] : calm [de-identified] : non toxic, in no acute distress  [de-identified] : NC/AT PERRL EOMI no scleral icterus  [de-identified] : trachea midline, no gross mass  [de-identified] : no audible wheezing or stridor  [de-identified] : mildly obese soft, no localizing tenderness, no guarding, no rebound, no masses   [de-identified] : phallus normal, no testicular mass or tenderness  [de-identified] : FROM of all extremities with no gross deformity or angulation, there is a small non tender umbilical hernia, there is no ventral hernia, no right inguinal hernia, there is no evidence of recurrent left inguinal hernia  [de-identified] : left inguinal incision site is healing well without evidence of infection  [de-identified] : mood is calm

## 2023-03-09 NOTE — HISTORY OF PRESENT ILLNESS
[de-identified] : The patient returns to the office with complaints of mild discomfort and soreness with exertion.  He has no abdominal pain, nausea, or vomit.

## 2023-03-09 NOTE — ASSESSMENT
[FreeTextEntry1] : The patient is stable and doing well following an open left inguinal hernia repair with mesh for an incarcerated left inguinal hernia.  The patient will follow up in 2 weeks time and advised to avoid strenuous activity and a lifting capacity not to exceed 20 pounds. Anticipated return to work date is 3/27/23.

## 2023-03-22 ENCOUNTER — APPOINTMENT (OUTPATIENT)
Dept: SURGERY | Facility: CLINIC | Age: 43
End: 2023-03-22
Payer: COMMERCIAL

## 2023-03-22 VITALS
DIASTOLIC BLOOD PRESSURE: 79 MMHG | WEIGHT: 213 LBS | RESPIRATION RATE: 16 BRPM | BODY MASS INDEX: 29.82 KG/M2 | TEMPERATURE: 96 F | SYSTOLIC BLOOD PRESSURE: 154 MMHG | OXYGEN SATURATION: 99 % | HEART RATE: 62 BPM | HEIGHT: 71 IN

## 2023-03-22 DIAGNOSIS — K40.90 UNILATERAL INGUINAL HERNIA, W/OUT OBSTRUCTION OR GANGRENE, NOT SPECIFIED AS RECURRENT: ICD-10-CM

## 2023-03-22 DIAGNOSIS — R10.32 LEFT LOWER QUADRANT PAIN: ICD-10-CM

## 2023-03-22 PROCEDURE — 99024 POSTOP FOLLOW-UP VISIT: CPT

## 2023-03-22 NOTE — PHYSICAL EXAM
[JVD] : no jugular venous distention  [Purpura] : no purpura  [Petechiae] : no petechiae [Skin Ulcer] : no ulcer [Skin Induration] : no induration [Alert] : alert [Oriented to Person] : oriented to person [Oriented to Place] : oriented to place [Oriented to Time] : oriented to time [Calm] : calm [de-identified] : non toxic, in no acute distress  [de-identified] : NC/AT PERRL EOMI no scleral icterus  [de-identified] : trachea midline, no gross mass  [de-identified] : no audible wheezing or stridor  [de-identified] : mildly obese soft, no localizing tenderness, no guarding, no rebound, no masses   [de-identified] : phallus normal, no testicular mass or tenderness  [de-identified] : FROM of all extremities with no gross deformity or angulation, there remains a small non tender umbilical hernia, there is no ventral hernia, no right inguinal hernia, there remains no evidence of recurrent left inguinal hernia  [de-identified] : left inguinal incision site has healed well without evidence of infection  [de-identified] : mood is calm

## 2023-03-22 NOTE — HISTORY OF PRESENT ILLNESS
[de-identified] : The patient returns to the office with no complaints.  The patient is without pain and feels well.

## 2023-03-22 NOTE — ASSESSMENT
[FreeTextEntry1] : The patient is stable and doing well following an open left inguinal hernia repair with mesh.  The patient is cleared to resume work and all activity as of 3/27/23. He will follow up as needed.

## 2023-12-12 ENCOUNTER — APPOINTMENT (OUTPATIENT)
Dept: FAMILY MEDICINE | Facility: CLINIC | Age: 43
End: 2023-12-12
Payer: COMMERCIAL

## 2023-12-12 ENCOUNTER — NON-APPOINTMENT (OUTPATIENT)
Age: 43
End: 2023-12-12

## 2023-12-12 VITALS
OXYGEN SATURATION: 99 % | SYSTOLIC BLOOD PRESSURE: 112 MMHG | BODY MASS INDEX: 29.4 KG/M2 | RESPIRATION RATE: 14 BRPM | WEIGHT: 210 LBS | HEART RATE: 73 BPM | DIASTOLIC BLOOD PRESSURE: 80 MMHG | TEMPERATURE: 98.6 F | HEIGHT: 71 IN

## 2023-12-12 PROCEDURE — 00028S VITAMIN D: CUSTOM

## 2023-12-12 PROCEDURE — 00011S: CUSTOM

## 2023-12-12 PROCEDURE — 00002S: CUSTOM

## 2023-12-12 PROCEDURE — 00027S: CUSTOM

## 2023-12-12 PROCEDURE — 00012S: CUSTOM

## 2023-12-12 PROCEDURE — 00009S: CUSTOM

## 2023-12-12 PROCEDURE — 00026S: CUSTOM

## 2023-12-12 PROCEDURE — 00029S TESTOSTERONE: CUSTOM

## 2023-12-12 PROCEDURE — 00018S: CUSTOM

## 2024-05-16 ENCOUNTER — APPOINTMENT (OUTPATIENT)
Dept: FAMILY MEDICINE | Facility: CLINIC | Age: 44
End: 2024-05-16
Payer: COMMERCIAL

## 2024-05-16 VITALS
DIASTOLIC BLOOD PRESSURE: 80 MMHG | OXYGEN SATURATION: 98 % | WEIGHT: 215 LBS | SYSTOLIC BLOOD PRESSURE: 120 MMHG | HEIGHT: 71 IN | HEART RATE: 78 BPM | BODY MASS INDEX: 30.1 KG/M2 | RESPIRATION RATE: 14 BRPM

## 2024-05-16 DIAGNOSIS — Z01.818 ENCOUNTER FOR OTHER PREPROCEDURAL EXAMINATION: ICD-10-CM

## 2024-05-16 DIAGNOSIS — Z00.00 ENCOUNTER FOR GENERAL ADULT MEDICAL EXAMINATION W/OUT ABNORMAL FINDINGS: ICD-10-CM

## 2024-05-16 PROCEDURE — 93000 ELECTROCARDIOGRAM COMPLETE: CPT

## 2024-05-16 PROCEDURE — 99386 PREV VISIT NEW AGE 40-64: CPT

## 2024-05-16 PROCEDURE — 36415 COLL VENOUS BLD VENIPUNCTURE: CPT

## 2024-05-20 LAB
24R-OH-CALCIDIOL SERPL-MCNC: 40.2 PG/ML
ALBUMIN SERPL ELPH-MCNC: 4.6 G/DL
ALP BLD-CCNC: 56 U/L
ALT SERPL-CCNC: 26 U/L
ANION GAP SERPL CALC-SCNC: 22 MMOL/L
APPEARANCE: CLEAR
AST SERPL-CCNC: 26 U/L
BASOPHILS # BLD AUTO: 0.07 K/UL
BASOPHILS NFR BLD AUTO: 0.8 %
BILIRUB SERPL-MCNC: 0.3 MG/DL
BILIRUBIN URINE: NEGATIVE
BLOOD URINE: NEGATIVE
BUN SERPL-MCNC: 28 MG/DL
CALCIUM SERPL-MCNC: 10 MG/DL
CHLORIDE SERPL-SCNC: 102 MMOL/L
CHOLEST SERPL-MCNC: 202 MG/DL
CO2 SERPL-SCNC: 17 MMOL/L
COLOR: YELLOW
CREAT SERPL-MCNC: 1.08 MG/DL
EGFR: 87 ML/MIN/1.73M2
EOSINOPHIL # BLD AUTO: 0.19 K/UL
EOSINOPHIL NFR BLD AUTO: 2.2 %
GLUCOSE QUALITATIVE U: NEGATIVE MG/DL
GLUCOSE SERPL-MCNC: 89 MG/DL
HCT VFR BLD CALC: 43.9 %
HDLC SERPL-MCNC: 42 MG/DL
HGB BLD-MCNC: 15 G/DL
IMM GRANULOCYTES NFR BLD AUTO: 0.7 %
KETONES URINE: NEGATIVE MG/DL
LDLC SERPL CALC-MCNC: 94 MG/DL
LEUKOCYTE ESTERASE URINE: NEGATIVE
LYMPHOCYTES # BLD AUTO: 2.84 K/UL
LYMPHOCYTES NFR BLD AUTO: 32.5 %
MAN DIFF?: NORMAL
MCHC RBC-ENTMCNC: 30.5 PG
MCHC RBC-ENTMCNC: 34.2 GM/DL
MCV RBC AUTO: 89.2 FL
MONOCYTES # BLD AUTO: 0.71 K/UL
MONOCYTES NFR BLD AUTO: 8.1 %
NEUTROPHILS # BLD AUTO: 4.88 K/UL
NEUTROPHILS NFR BLD AUTO: 55.7 %
NITRITE URINE: NEGATIVE
NONHDLC SERPL-MCNC: 160 MG/DL
PH URINE: 6
PLATELET # BLD AUTO: 264 K/UL
POTASSIUM SERPL-SCNC: 4.5 MMOL/L
PROT SERPL-MCNC: 7.1 G/DL
PROTEIN URINE: NEGATIVE MG/DL
RBC # BLD: 4.92 M/UL
RBC # FLD: 12.5 %
SODIUM SERPL-SCNC: 141 MMOL/L
SPECIFIC GRAVITY URINE: >1.03
TESTOST SERPL-MCNC: 387 NG/DL
TRIGL SERPL-MCNC: 395 MG/DL
TSH SERPL-ACNC: 1.6 UIU/ML
UROBILINOGEN URINE: 1 MG/DL
WBC # FLD AUTO: 8.75 K/UL

## 2024-05-21 LAB — PSA SERPL-MCNC: 0.73 NG/ML

## 2024-05-23 PROBLEM — Z01.818 PRE-PROCEDURAL EXAMINATION: Status: RESOLVED | Noted: 2023-02-13 | Resolved: 2024-05-23

## 2024-05-23 NOTE — HEALTH RISK ASSESSMENT
[Never] : Never [Yes] : Yes [Monthly or less (1 pt)] : Monthly or less (1 point) [1 or 2 (0 pts)] : 1 or 2 (0 points) [Never (0 pts)] : Never (0 points) [No] : In the past 12 months have you used drugs other than those required for medical reasons? No [No falls in past year] : Patient reported no falls in the past year [0] : 2) Feeling down, depressed, or hopeless: Not at all (0) [PHQ-2 Negative - No further assessment needed] : PHQ-2 Negative - No further assessment needed [Audit-CScore] : 1 [[id number]] : 0 [HIV Test offered] : HIV Test offered [Hepatitis C test offered] : Hepatitis C test offered [Change in mental status noted] : No change in mental status noted [None] : None [With Family] : lives with family [College] : College [Fully functional (bathing, dressing, toileting, transferring, walking, feeding)] : Fully functional (bathing, dressing, toileting, transferring, walking, feeding) [Fully functional (using the telephone, shopping, preparing meals, housekeeping, doing laundry, using] : Fully functional and needs no help or supervision to perform IADLs (using the telephone, shopping, preparing meals, housekeeping, doing laundry, using transportation, managing medications and managing finances) [Reports changes in hearing] : Reports no changes in hearing [Reports changes in vision] : Reports no changes in vision [Reports changes in dental health] : Reports no changes in dental health [Smoke Detector] : smoke detector [Carbon Monoxide Detector] : carbon monoxide detector [Safety elements used in home] : safety elements used in home [Seat Belt] :  uses seat belt [Patient/Caregiver not ready to engage] : , patient/caregiver not ready to engage

## 2024-05-23 NOTE — ASSESSMENT
[FreeTextEntry1] : New Patient CPE   Patient 43 YOM presents for CPE PMHX Prior left inguinal hernia repair   Care plan reviewed Labs RTC 4 weeks

## 2024-05-23 NOTE — HISTORY OF PRESENT ILLNESS
[FreeTextEntry1] : New Patient CPE   Patient 43 YOM presents for CPE PMHX Prior left inguinal hernia repair

## 2024-07-24 ENCOUNTER — APPOINTMENT (OUTPATIENT)
Dept: FAMILY MEDICINE | Facility: CLINIC | Age: 44
End: 2024-07-24
Payer: COMMERCIAL

## 2024-07-24 VITALS
HEIGHT: 71 IN | OXYGEN SATURATION: 99 % | HEART RATE: 80 BPM | DIASTOLIC BLOOD PRESSURE: 78 MMHG | RESPIRATION RATE: 14 BRPM | WEIGHT: 208 LBS | BODY MASS INDEX: 29.12 KG/M2 | SYSTOLIC BLOOD PRESSURE: 128 MMHG

## 2024-07-24 DIAGNOSIS — Z51.81 ENCOUNTER FOR THERAPEUTIC DRUG LVL MONITORING: ICD-10-CM

## 2024-07-24 DIAGNOSIS — B35.1 TINEA UNGUIUM: ICD-10-CM

## 2024-07-24 DIAGNOSIS — K40.90 UNILATERAL INGUINAL HERNIA, W/OUT OBSTRUCTION OR GANGRENE, NOT SPECIFIED AS RECURRENT: ICD-10-CM

## 2024-07-24 PROCEDURE — 99213 OFFICE O/P EST LOW 20 MIN: CPT

## 2024-07-24 NOTE — HEALTH RISK ASSESSMENT
[Yes] : Yes [Monthly or less (1 pt)] : Monthly or less (1 point) [1 or 2 (0 pts)] : 1 or 2 (0 points) [Never (0 pts)] : Never (0 points) [No] : In the past 12 months have you used drugs other than those required for medical reasons? No [No falls in past year] : Patient reported no falls in the past year [0] : 2) Feeling down, depressed, or hopeless: Not at all (0) [PHQ-2 Negative - No further assessment needed] : PHQ-2 Negative - No further assessment needed [Patient/Caregiver not ready to engage] : , patient/caregiver not ready to engage [I will adhere to the patient's wishes.] : I will adhere to the patient's wishes. [Time Spent: ___ minutes] : Time Spent: [unfilled] minutes [Never] : Never [Audit-CScore] : 1 [de-identified] : Average [de-identified] : Average [facility] : 8 [[id number]] : 0 [AdvancecareDate] : 01/23

## 2024-07-24 NOTE — HISTORY OF PRESENT ILLNESS
[FreeTextEntry1] : Follow up of a 44 y/o male w/ PMHx of left inguinal hernia repair (march 2023). Reports he is on terbinafine for L big toe fungus and was told he needs his liver enzymes checked every 30 days. He was prescribed this by Dr. Rivers podiatrist.  [de-identified] : Follow up of a 42 y/o male w/ PMHx of left inguinal hernia repair (march 2023). Reports he is on terbinafine for L big toe fungus and was told he needs his liver enzymes checked every 30 days. He was prescribed this by Dr. Rivers podiatrist.

## 2024-07-24 NOTE — HEALTH RISK ASSESSMENT
[Yes] : Yes [Monthly or less (1 pt)] : Monthly or less (1 point) [1 or 2 (0 pts)] : 1 or 2 (0 points) [Never (0 pts)] : Never (0 points) [No] : In the past 12 months have you used drugs other than those required for medical reasons? No [No falls in past year] : Patient reported no falls in the past year [0] : 2) Feeling down, depressed, or hopeless: Not at all (0) [PHQ-2 Negative - No further assessment needed] : PHQ-2 Negative - No further assessment needed [Patient/Caregiver not ready to engage] : , patient/caregiver not ready to engage [I will adhere to the patient's wishes.] : I will adhere to the patient's wishes. [Time Spent: ___ minutes] : Time Spent: [unfilled] minutes [Never] : Never [Audit-CScore] : 1 [de-identified] : Average [de-identified] : Average [facility] : 8 [[id number]] : 0 [AdvancecareDate] : 01/23

## 2024-07-24 NOTE — HEALTH RISK ASSESSMENT
[Yes] : Yes [Monthly or less (1 pt)] : Monthly or less (1 point) [1 or 2 (0 pts)] : 1 or 2 (0 points) [Never (0 pts)] : Never (0 points) [No] : In the past 12 months have you used drugs other than those required for medical reasons? No [No falls in past year] : Patient reported no falls in the past year [0] : 2) Feeling down, depressed, or hopeless: Not at all (0) [PHQ-2 Negative - No further assessment needed] : PHQ-2 Negative - No further assessment needed [Patient/Caregiver not ready to engage] : , patient/caregiver not ready to engage [I will adhere to the patient's wishes.] : I will adhere to the patient's wishes. [Time Spent: ___ minutes] : Time Spent: [unfilled] minutes [Never] : Never [Audit-CScore] : 1 [de-identified] : Average [de-identified] : Average [facility] : [facility] [[id number]] : 0 [AdvancecareDate] : 01/23

## 2024-07-24 NOTE — REVIEW OF SYSTEMS
[Patient Intake Form Reviewed] : Patient intake form was reviewed [Negative] : Heme/Lymph [de-identified] : L big toe fungus

## 2024-07-24 NOTE — REVIEW OF SYSTEMS
[Patient Intake Form Reviewed] : Patient intake form was reviewed [Negative] : Heme/Lymph [de-identified] : L big toe fungus

## 2024-07-24 NOTE — PLAN
[FreeTextEntry1] : Follow up of a 42 y/o male w/ PMHx of left inguinal hernia repair (march 2023). Reports he is on terbinafine for L big toe fungus and was told he needs his liver enzymes checked every 30 days. He was prescribed this by Dr. Rivers podiatrist.   -care plan reviewed -labs drawn (CMP) -RTC in 1 month

## 2024-07-24 NOTE — HISTORY OF PRESENT ILLNESS
[FreeTextEntry1] : Follow up of a 42 y/o male w/ PMHx of left inguinal hernia repair (march 2023). Reports he is on terbinafine for L big toe fungus and was told he needs his liver enzymes checked every 30 days. He was prescribed this by Dr. Rivers podiatrist.  [de-identified] : Follow up of a 44 y/o male w/ PMHx of left inguinal hernia repair (march 2023). Reports he is on terbinafine for L big toe fungus and was told he needs his liver enzymes checked every 30 days. He was prescribed this by Dr. Rivers podiatrist.

## 2024-07-24 NOTE — REVIEW OF SYSTEMS
[Patient Intake Form Reviewed] : Patient intake form was reviewed [Negative] : Heme/Lymph [de-identified] : L big toe fungus

## 2024-07-24 NOTE — HISTORY OF PRESENT ILLNESS
[FreeTextEntry1] : Follow up of a 42 y/o male w/ PMHx of left inguinal hernia repair (march 2023). Reports he is on terbinafine for L big toe fungus and was told he needs his liver enzymes checked every 30 days. He was prescribed this by Dr. Rivers podiatrist.  [de-identified] : Follow up of a 42 y/o male w/ PMHx of left inguinal hernia repair (march 2023). Reports he is on terbinafine for L big toe fungus and was told he needs his liver enzymes checked every 30 days. He was prescribed this by Dr. Rivers podiatrist.

## 2024-07-24 NOTE — PLAN
[FreeTextEntry1] : Follow up of a 44 y/o male w/ PMHx of left inguinal hernia repair (march 2023). Reports he is on terbinafine for L big toe fungus and was told he needs his liver enzymes checked every 30 days. He was prescribed this by Dr. Rivers podiatrist.   -care plan reviewed -labs drawn (CMP) -RTC in 1 month

## 2024-07-25 LAB
ALBUMIN SERPL ELPH-MCNC: 5 G/DL
ALP BLD-CCNC: 53 U/L
ALT SERPL-CCNC: 23 U/L
ANION GAP SERPL CALC-SCNC: 13 MMOL/L
AST SERPL-CCNC: 24 U/L
BILIRUB SERPL-MCNC: 0.6 MG/DL
BUN SERPL-MCNC: 18 MG/DL
CALCIUM SERPL-MCNC: 10 MG/DL
CHLORIDE SERPL-SCNC: 100 MMOL/L
CO2 SERPL-SCNC: 26 MMOL/L
CREAT SERPL-MCNC: 1.02 MG/DL
EGFR: 94 ML/MIN/1.73M2
GLUCOSE SERPL-MCNC: 96 MG/DL
POTASSIUM SERPL-SCNC: 4.6 MMOL/L
PROT SERPL-MCNC: 7.7 G/DL
SODIUM SERPL-SCNC: 138 MMOL/L

## 2025-01-08 ENCOUNTER — APPOINTMENT (OUTPATIENT)
Dept: FAMILY MEDICINE | Facility: CLINIC | Age: 45
End: 2025-01-08
Payer: COMMERCIAL

## 2025-01-08 ENCOUNTER — NON-APPOINTMENT (OUTPATIENT)
Age: 45
End: 2025-01-08

## 2025-01-08 VITALS
WEIGHT: 214 LBS | HEART RATE: 74 BPM | HEIGHT: 71 IN | RESPIRATION RATE: 12 BRPM | SYSTOLIC BLOOD PRESSURE: 116 MMHG | BODY MASS INDEX: 29.96 KG/M2 | DIASTOLIC BLOOD PRESSURE: 80 MMHG | OXYGEN SATURATION: 98 %

## 2025-01-08 PROCEDURE — 00011S: CUSTOM

## 2025-01-08 PROCEDURE — 00002S: CUSTOM

## 2025-01-08 PROCEDURE — 00027S: CUSTOM

## 2025-01-08 PROCEDURE — 00012S: CUSTOM

## 2025-01-08 PROCEDURE — 00009S: CUSTOM

## 2025-01-08 PROCEDURE — 00028S VITAMIN D: CUSTOM

## 2025-01-08 PROCEDURE — 00018S: CUSTOM

## 2025-01-08 PROCEDURE — 00026S: CUSTOM

## 2025-01-08 PROCEDURE — 00029S TESTOSTERONE: CUSTOM

## 2025-03-24 ENCOUNTER — APPOINTMENT (OUTPATIENT)
Dept: FAMILY MEDICINE | Facility: CLINIC | Age: 45
End: 2025-03-24

## (undated) DEVICE — DRSG STERISTRIPS 0.5 X 4"

## (undated) DEVICE — DRSG MASTISOL

## (undated) DEVICE — SUT PROLENE 1 30" CTX

## (undated) DEVICE — WARMING BLANKET UPPER ADULT

## (undated) DEVICE — SYR CONTROL LUER LOK 10CC

## (undated) DEVICE — GLV 8 PROTEXIS (WHITE)

## (undated) DEVICE — SUT VICRYL 3-0 27" SH UNDYED

## (undated) DEVICE — DRSG ADHESIVE PRIMER MULTIPURPOSE 3M

## (undated) DEVICE — VENODYNE/SCD SLEEVE CALF MEDIUM

## (undated) DEVICE — GLV 8 PROTEXIS (BLUE)

## (undated) DEVICE — SUT PROLENE 1 30" CT-1

## (undated) DEVICE — PACK MINOR

## (undated) DEVICE — SUT PDS II 1 48" TP-1

## (undated) DEVICE — SUT VICRYL PLUS 4-0 18" PS-2 UNDYED

## (undated) DEVICE — STAPLER SKIN PROXIMATE